# Patient Record
Sex: FEMALE | Employment: UNEMPLOYED | URBAN - METROPOLITAN AREA
[De-identification: names, ages, dates, MRNs, and addresses within clinical notes are randomized per-mention and may not be internally consistent; named-entity substitution may affect disease eponyms.]

---

## 2022-12-07 ENCOUNTER — ANCILLARY PROCEDURE (OUTPATIENT)
Dept: GENERAL RADIOLOGY | Facility: CLINIC | Age: 57
End: 2022-12-07
Attending: FAMILY MEDICINE
Payer: COMMERCIAL

## 2022-12-07 ENCOUNTER — OFFICE VISIT (OUTPATIENT)
Dept: ORTHOPEDICS | Facility: CLINIC | Age: 57
End: 2022-12-07
Payer: COMMERCIAL

## 2022-12-07 VITALS
HEIGHT: 67 IN | SYSTOLIC BLOOD PRESSURE: 166 MMHG | DIASTOLIC BLOOD PRESSURE: 108 MMHG | WEIGHT: 193.2 LBS | BODY MASS INDEX: 30.32 KG/M2

## 2022-12-07 DIAGNOSIS — M17.12 PRIMARY OSTEOARTHRITIS OF LEFT KNEE: Primary | ICD-10-CM

## 2022-12-07 DIAGNOSIS — M25.562 LEFT KNEE PAIN: ICD-10-CM

## 2022-12-07 DIAGNOSIS — M25.562 CHRONIC PAIN OF LEFT KNEE: ICD-10-CM

## 2022-12-07 DIAGNOSIS — G89.29 CHRONIC PAIN OF LEFT KNEE: ICD-10-CM

## 2022-12-07 PROCEDURE — 99204 OFFICE O/P NEW MOD 45 MIN: CPT | Performed by: FAMILY MEDICINE

## 2022-12-07 PROCEDURE — 73562 X-RAY EXAM OF KNEE 3: CPT | Mod: TC | Performed by: RADIOLOGY

## 2022-12-07 RX ORDER — MELOXICAM 15 MG/1
15 TABLET ORAL DAILY
Qty: 30 TABLET | Refills: 0 | Status: SHIPPED | OUTPATIENT
Start: 2022-12-07 | End: 2023-01-06

## 2022-12-07 NOTE — PATIENT INSTRUCTIONS
Thank you for choosing [facility]    [facility] LOCATIONS  [place] [address] [facility], [address]th [place]   [place], [address] [place] [address]   [phone]       APPOINTMENTS: [phone]    CARE QUESTIONS: [phone],    BILLING QUESTIONS: [phone]    FAX NUMBER: [phone]        Follow up: 6 weeks. Please call [phone] to schedule your follow up visit with Dr. Smalls.      1. Primary osteoarthritis of left knee    2. Left knee pain        Physical Therapy orders have been placed with [facility] (formally [facility])  You can call [phone] to schedule at your convenience.

## 2022-12-07 NOTE — LETTER
"    12/7/2022         RE: Jessy Jaquez  [address] [address]        Dear Colleague,    Thank you for referring your patient, Jessy Jaquez, to the [facility] [place]. Please see a copy of my visit note below.    CHIEF COMPLAINT:  Pain of the Left Knee       HISTORY OF PRESENT ILLNESS  Ms. Jaquez is a pleasant 57 year old year old female who presents to clinic today with left knee pain.  Jessy explains that her pain began 1 year ago. She developed swelling, pain and redness after a walk but does not recall an injury.  She has been having difficulty now with her beach body workouts, prolonged walking and has had quite a bit of difficulty over the summer with walking during vacations.    Onset: sudden  Location: left knee  Quality:  aching and stabbing  Duration: 1 years   Severity: 9/10 at worst  Timing:intermittent episodes   Modifying factors:  resting and non-use makes it better, movement and use makes it worse  Associated signs & symptoms: pain that worsens at night, swelling, decreased ROM, instability, \"slipping\" sensation going down stairs  Treatments to date: ice, heat, ibuprofen, activity modification    Additional history: as documented    Review of Systems:  Have you recently had a a fever, chills, weight loss? No  Do you have any vision problems? No  Do you have any chest pain or edema? No  Do you have any shortness of breath or wheezing?  No  Do you have stomach problems? No  Do you have any numbness or focal weakness? No  Do you have diabetes? No  Do you have problems with bleeding or clotting? No  Do you have an rashes or other skin lesions? No      MEDICAL HISTORY  There is no problem list on file for this patient.      Current Outpatient Medications   Medication Sig Dispense Refill     meloxicam (MOBIC) 15 MG tablet Take 1 tablet (15 mg) by mouth daily 30 tablet 0       No Known Allergies    No family history on file.    Additional " "medical/Social/Surgical histories reviewed in [facility] and updated as appropriate.       PHYSICAL EXAM  BP (!) 166/108   Ht 1.695 m (5' 6.75\")   Wt 87.6 kg (193 lb 3.2 oz)   BMI 30.49 kg/m      General  - normal appearance, in no obvious distress  Musculoskeletal - Left knee  - stance: mildly antalgic gait  - inspection: trace effusion  - palpation: medial joint line tenderness, tender medial patellar facet  - ROM: 120 degrees flexion, 0 degrees extension, painful active ROM  - strength: 5/5 in flexion, 5/5 in extension  - special tests:  (-) Derick  (-) varus at 0 and 30 degrees flexion  (-) valgus at 0 and 30 degrees flexion  Neuro  - no sensory or motor deficit, grossly normal coordination, normal muscle tone     IMAGING : XR left knee 3V. Final results and radiologist's interpretation, available in the [facility] health record. Images were reviewed with the patient/family members in the office today. My personal interpretation of the performed imaging is at least moderate medial compartment degenerative changes, mild patellofemoral degenerative change.     ASSESSMENT & PLAN  Ms. Jaquez is a 57 year old year old female who presents to clinic today with chronic left sided knee pain over the last 11 months.    Clinical examination as well as radiograph consistent with symptomatic medial compartment osteoarthritis of left knee    Diagnosis: Primary arthritis of left knee    Pathophysiology and treatment options discussed for osteoarthritis of knee.  We reviewed active weight management and loss of 1 lb is perceived as 4# loss perceived by knee.  Low impact exercise such as biking, swimming and elliptical is most beneficial.  Bracing options, injections and brief discussion about arthroplasty was also had.      We agreed to proceed with physical therapy initially, as her knee is not particularly painful over the last week.  I will see her back in 6 weeks and if she has been having intermittent flares with her rehab, I would " then recommend we start with corticosteroid injection at that time.  At this visit we will also consider treating prior authorization for future Synvisc or Durolane injections.    She can use prescribed meloxicam once daily as needed for particularly painful or breakthrough days.  She knows not to take Aleve or ibuprofen concurrently with meloxicam.    It was a pleasure seeing Jessy today.    Aniket Smalls DO, [facility]  Primary Care Sports Medicine      Again, thank you for allowing me to participate in the care of your patient.        Sincerely,        Aniket Smalls DO

## 2022-12-19 ENCOUNTER — THERAPY VISIT (OUTPATIENT)
Dept: PHYSICAL THERAPY | Facility: CLINIC | Age: 57
End: 2022-12-19
Attending: FAMILY MEDICINE
Payer: COMMERCIAL

## 2022-12-19 DIAGNOSIS — G89.29 CHRONIC PAIN OF LEFT KNEE: ICD-10-CM

## 2022-12-19 DIAGNOSIS — M17.12 PRIMARY OSTEOARTHRITIS OF LEFT KNEE: ICD-10-CM

## 2022-12-19 DIAGNOSIS — M25.562 CHRONIC PAIN OF LEFT KNEE: ICD-10-CM

## 2022-12-19 PROCEDURE — 97110 THERAPEUTIC EXERCISES: CPT | Mod: GP | Performed by: PHYSICAL THERAPIST

## 2022-12-19 PROCEDURE — 97530 THERAPEUTIC ACTIVITIES: CPT | Mod: GP | Performed by: PHYSICAL THERAPIST

## 2022-12-19 PROCEDURE — 97161 PT EVAL LOW COMPLEX 20 MIN: CPT | Mod: GP | Performed by: PHYSICAL THERAPIST

## 2022-12-19 ASSESSMENT — ACTIVITIES OF DAILY LIVING (ADL)
RAW_SCORE: 44
KNEE_ACTIVITY_OF_DAILY_LIVING_SUM: 44
GIVING WAY, BUCKLING OR SHIFTING OF KNEE: THE SYMPTOM AFFECTS MY ACTIVITY SLIGHTLY
GO DOWN STAIRS: ACTIVITY IS SOMEWHAT DIFFICULT
RISE FROM A CHAIR: ACTIVITY IS MINIMALLY DIFFICULT
WALK: ACTIVITY IS SOMEWHAT DIFFICULT
AS_A_RESULT_OF_YOUR_KNEE_INJURY,_HOW_WOULD_YOU_RATE_YOUR_CURRENT_LEVEL_OF_DAILY_ACTIVITY?: SEVERELY ABNORMAL
SWELLING: I DO NOT HAVE THE SYMPTOM
KNEE_ACTIVITY_OF_DAILY_LIVING_SCORE: 62.86
SIT WITH YOUR KNEE BENT: ACTIVITY IS SOMEWHAT DIFFICULT
KNEEL ON THE FRONT OF YOUR KNEE: ACTIVITY IS VERY DIFFICULT
PAIN: THE SYMPTOM AFFECTS MY ACTIVITY MODERATELY
GO UP STAIRS: ACTIVITY IS SOMEWHAT DIFFICULT
SQUAT: ACTIVITY IS SOMEWHAT DIFFICULT
LIMPING: THE SYMPTOM AFFECTS MY ACTIVITY SLIGHTLY
WEAKNESS: THE SYMPTOM AFFECTS MY ACTIVITY SLIGHTLY
STAND: ACTIVITY IS NOT DIFFICULT
HOW_WOULD_YOU_RATE_THE_CURRENT_FUNCTION_OF_YOUR_KNEE_DURING_YOUR_USUAL_DAILY_ACTIVITIES_ON_A_SCALE_FROM_0_TO_100_WITH_100_BEING_YOUR_LEVEL_OF_KNEE_FUNCTION_PRIOR_TO_YOUR_INJURY_AND_0_BEING_THE_INABILITY_TO_PERFORM_ANY_OF_YOUR_USUAL_DAILY_ACTIVITIES?: 50
STIFFNESS: THE SYMPTOM AFFECTS MY ACTIVITY SLIGHTLY
HOW_WOULD_YOU_RATE_THE_OVERALL_FUNCTION_OF_YOUR_KNEE_DURING_YOUR_USUAL_DAILY_ACTIVITIES?: ABNORMAL

## 2022-12-19 NOTE — PROGRESS NOTES
Physical Therapy Initial Evaluation  Subjective:  Pt reports that her knee pain has started to get better in the past month.  She loves to work out.  She lost 30 lbs in 2019.  Last October she went for a walk, and all of a sudden her knee felt like it was on fire and it started to swell.  It was painful for 5-6 months and she couldn't work out or do much.  She started doing yoga recently and has started feeling better - she has been forcing herself to work through some of the pain and now it is feeling better.  She takes meloxicam for pain, which helps.  She has twinges of pain intermittently on the medial side of her knee.  Her goal is to be able to work out again, because she has gained weight and wants to get healthy again.    The history is provided by the patient. No  was used.   Patient Health History  Jessy Jaquez being seen for L knee pain.     Problem began: 12/18/2021.   Problem occurred: walking   Pain is reported as 2/10 on pain scale.  General health as reported by patient is good.  Pertinent medical history includes: overweight and osteoarthritis.   Red flags:  None as reported by patient.  Medical allergies: none.   Surgeries include:  Other. Other surgery history details: appendix.    Current medications:  Pain medication.    Current occupation is writer.   Primary job tasks include:  Prolonged sitting.                  Therapist Generated HPI Evaluation         Type of problem:  Left knee.    This is a chronic condition.  Condition occurred with:  Degenerative joint disease.  Where condition occurred: for unknown reasons.  Patient reports pain:  Medial.  Pain is described as aching and sharp and is intermittent.  Pain is worse during the night.  Since onset symptoms are gradually improving.  Associated symptoms:  Loss of motion/stiffness and loss of strength. Symptoms are exacerbated by ascending stairs, descending stairs, bending/squatting, walking and sitting    Special tests  included:  X-ray.    Restrictions due to condition include:  Working in normal job without restrictions.  Barriers include:  None as reported by patient.                        Objective:  System                                           Hip Evaluation    Hip Strength:        Abduction:  Left: 4-/5     Pain:Right: 5/5    Pain:    Internal Rotation:  Left: 4+/5    Pain:Right: 5/5   Pain:  External Rotation:  Left: 4/5   +  Pain:  Right: 5/5   Pain:                     Knee Evaluation:  ROM:    AROM    Hyperextension:  Left:  0    Right: 4  Extension:  Left: 9 (PDM)    Right:  0  Flexion: Left: 120 (ERP)    Right: 127        Strength:     Extension:  Left: 4+/5   Pain:      Right: 5/5   Pain:  Flexion:  Left: 5/5   Pain:      Right: 5/5   Pain:    Quad Set Left: Good    Pain:   Quad Set Right: Good    Pain:    Special Tests: Special test for knee: L knee AROM 0-0-124 and able to do a partial squat wtih 0/10 pain after repeated seated knee ext w/self OP - indicates derangement at knee joint.          Mobility Testing:      Patellofemoral Medial:  Left: hypomobile    Right: normal  Patellofemoral Lateral:  Left: hypomobile    Right: normal  Patellofemoral Superior:  Left: hypomobile    Right: normal  Patellofemoral Inferior:  Left: hypomobile    Right: normal        General     ROS    Assessment/Plan:    Patient is a 57 year old female with left side knee complaints.    Patient has the following significant findings with corresponding treatment plan.                Diagnosis 1:  L knee pain secondary to derangement vs dysfunction  Pain -  hot/cold therapy, directional preference exercise and home program  Decreased ROM/flexibility - manual therapy, therapeutic exercise and home program  Decreased strength - therapeutic exercise, therapeutic activities and home program  Impaired muscle performance - neuro re-education and home program  Decreased function - therapeutic activities and home program    Therapy Evaluation  Codes:   Cumulative Therapy Evaluation is: Low complexity.    Previous and current functional limitations:  (See Goal Flow Sheet for this information)    Short term and Long term goals: (See Goal Flow Sheet for this information)     Communication ability:  Patient appears to be able to clearly communicate and understand verbal and written communication and follow directions correctly.  Treatment Explanation - The following has been discussed with the patient:   RX ordered/plan of care  Anticipated outcomes  Possible risks and side effects  This patient would benefit from PT intervention to resume normal activities.   Rehab potential is good.    Frequency:  1 X week, once daily  Duration:  for 6 weeks  Discharge Plan:  Achieve all LTG.  Independent in home treatment program.  Reach maximal therapeutic benefit.    Please refer to the daily flowsheet for treatment today, total treatment time and time spent performing 1:1 timed codes.

## 2023-01-04 ENCOUNTER — THERAPY VISIT (OUTPATIENT)
Dept: PHYSICAL THERAPY | Facility: CLINIC | Age: 58
End: 2023-01-04
Payer: COMMERCIAL

## 2023-01-04 DIAGNOSIS — G89.29 CHRONIC PAIN OF LEFT KNEE: Primary | ICD-10-CM

## 2023-01-04 DIAGNOSIS — M25.562 CHRONIC PAIN OF LEFT KNEE: Primary | ICD-10-CM

## 2023-01-04 PROCEDURE — 97110 THERAPEUTIC EXERCISES: CPT | Mod: GP | Performed by: PHYSICAL THERAPIST

## 2023-01-05 DIAGNOSIS — M17.12 PRIMARY OSTEOARTHRITIS OF LEFT KNEE: ICD-10-CM

## 2023-01-06 RX ORDER — MELOXICAM 15 MG/1
TABLET ORAL
Qty: 30 TABLET | Refills: 0 | Status: SHIPPED | OUTPATIENT
Start: 2023-01-06 | End: 2023-02-16

## 2023-01-31 ENCOUNTER — THERAPY VISIT (OUTPATIENT)
Dept: PHYSICAL THERAPY | Facility: CLINIC | Age: 58
End: 2023-01-31
Payer: COMMERCIAL

## 2023-01-31 DIAGNOSIS — G89.29 CHRONIC PAIN OF LEFT KNEE: Primary | ICD-10-CM

## 2023-01-31 DIAGNOSIS — M25.562 CHRONIC PAIN OF LEFT KNEE: Primary | ICD-10-CM

## 2023-01-31 PROCEDURE — 97112 NEUROMUSCULAR REEDUCATION: CPT | Mod: GP | Performed by: PHYSICAL THERAPIST

## 2023-01-31 PROCEDURE — 97110 THERAPEUTIC EXERCISES: CPT | Mod: GP | Performed by: PHYSICAL THERAPIST

## 2023-01-31 ASSESSMENT — ACTIVITIES OF DAILY LIVING (ADL)
SQUAT: ACTIVITY IS NOT DIFFICULT
GO DOWN STAIRS: ACTIVITY IS NOT DIFFICULT
KNEE_ACTIVITY_OF_DAILY_LIVING_SCORE: 94.29
RAW_SCORE: 66
STAND: ACTIVITY IS NOT DIFFICULT
KNEE_ACTIVITY_OF_DAILY_LIVING_SUM: 66
SIT WITH YOUR KNEE BENT: ACTIVITY IS NOT DIFFICULT
PAIN: I HAVE THE SYMPTOM BUT IT DOES NOT AFFECT MY ACTIVITY
AS_A_RESULT_OF_YOUR_KNEE_INJURY,_HOW_WOULD_YOU_RATE_YOUR_CURRENT_LEVEL_OF_DAILY_ACTIVITY?: NORMAL
LIMPING: I DO NOT HAVE THE SYMPTOM
HOW_WOULD_YOU_RATE_THE_OVERALL_FUNCTION_OF_YOUR_KNEE_DURING_YOUR_USUAL_DAILY_ACTIVITIES?: NEARLY NORMAL
WEAKNESS: I DO NOT HAVE THE SYMPTOM
KNEEL ON THE FRONT OF YOUR KNEE: ACTIVITY IS MINIMALLY DIFFICULT
HOW_WOULD_YOU_RATE_THE_CURRENT_FUNCTION_OF_YOUR_KNEE_DURING_YOUR_USUAL_DAILY_ACTIVITIES_ON_A_SCALE_FROM_0_TO_100_WITH_100_BEING_YOUR_LEVEL_OF_KNEE_FUNCTION_PRIOR_TO_YOUR_INJURY_AND_0_BEING_THE_INABILITY_TO_PERFORM_ANY_OF_YOUR_USUAL_DAILY_ACTIVITIES?: 80
SWELLING: I DO NOT HAVE THE SYMPTOM
STIFFNESS: THE SYMPTOM AFFECTS MY ACTIVITY SLIGHTLY
RISE FROM A CHAIR: ACTIVITY IS NOT DIFFICULT
GIVING WAY, BUCKLING OR SHIFTING OF KNEE: I DO NOT HAVE THE SYMPTOM
GO UP STAIRS: ACTIVITY IS NOT DIFFICULT
WALK: ACTIVITY IS NOT DIFFICULT

## 2023-02-01 NOTE — PROGRESS NOTES
"DISCHARGE REPORT    Progress reporting period is from 12/19/2022 to 1/31/2023.       SUBJECTIVE  Subjective changes noted by patient:  Pt reports that her knee is feeling better - it is definitely feeling strong.  She is able to take the stairs better.  She doesn't feel like she is limping, but she still gets some \"twinges\" of sharp pain on the inside of her L knee when stepping down with walking.  She has been doing a work-out where she does some shallow squats and some yoga poses that she hasn't been able to do for a while.  She feels ready today after the PT session to continue with her HEP independently to self-manage her symptoms.  Changes in function:  Yes (See Goal flowsheet attached for changes in current functional level)  Adverse reaction to treatment or activity: None    OBJECTIVE  Changes noted in objective findings:  Yes, see below.  Objective: L knee AROM 2-0-135.  MMT L knee ext 5/5, flex 5/5, hip abd 5/5, hip IR 5/5, hip ER 5/5.  Pain with standing on L LE only when allowing arch to fall at foot - spent today on CKC exercises with focus on maintaining arch in foot.  Discussed benefits of arch supports.     ASSESSMENT/PLAN  STG/LTGs have been met or progress has been made towards goals:  Yes (See Goal flow sheet completed today.)  Assessment of Progress: The patient has met all of their long term goals.  Self Management Plans:  Patient is independent in a home treatment program.  I have re-evaluated this patient and find that the nature, scope, duration and intensity of the therapy is appropriate for the medical condition of the patient.  Jessy continues to require the following intervention to meet STG and LTG's:  PT intervention is no longer required to meet STG/LTG.    Recommendations:  This patient is ready to be discharged from therapy and continue their home treatment program.    Please refer to the daily flowsheet for treatment today, total treatment time and time spent performing 1:1 timed " codes.

## 2023-02-14 DIAGNOSIS — M17.12 PRIMARY OSTEOARTHRITIS OF LEFT KNEE: ICD-10-CM

## 2023-02-16 RX ORDER — MELOXICAM 15 MG/1
TABLET ORAL
Qty: 30 TABLET | Refills: 0 | Status: SHIPPED | OUTPATIENT
Start: 2023-02-16

## 2025-03-13 NOTE — PROGRESS NOTES
"CHIEF COMPLAINT:  Pain of the Left Knee       HISTORY OF PRESENT ILLNESS  Ms. Jaquze is a pleasant 57 year old year old female who presents to clinic today with left knee pain.  Jessy explains that her pain began 1 year ago. She developed swelling, pain and redness after a walk but does not recall an injury.  She has been having difficulty now with her beach body workouts, prolonged walking and has had quite a bit of difficulty over the summer with walking during vacations.    Onset: sudden  Location: left knee  Quality:  aching and stabbing  Duration: 1 years   Severity: 9/10 at worst  Timing:intermittent episodes   Modifying factors:  resting and non-use makes it better, movement and use makes it worse  Associated signs & symptoms: pain that worsens at night, swelling, decreased ROM, instability, \"slipping\" sensation going down stairs  Treatments to date: ice, heat, ibuprofen, activity modification    Additional history: as documented    Review of Systems:  Have you recently had a a fever, chills, weight loss? No  Do you have any vision problems? No  Do you have any chest pain or edema? No  Do you have any shortness of breath or wheezing?  No  Do you have stomach problems? No  Do you have any numbness or focal weakness? No  Do you have diabetes? No  Do you have problems with bleeding or clotting? No  Do you have an rashes or other skin lesions? No      MEDICAL HISTORY  There is no problem list on file for this patient.      Current Outpatient Medications   Medication Sig Dispense Refill     meloxicam (MOBIC) 15 MG tablet Take 1 tablet (15 mg) by mouth daily 30 tablet 0       No Known Allergies    No family history on file.    Additional medical/Social/Surgical histories reviewed in [facility] and updated as appropriate.       PHYSICAL EXAM  BP (!) 166/108   Ht 1.695 m (5' 6.75\")   Wt 87.6 kg (193 lb 3.2 oz)   BMI 30.49 kg/m      General  - normal appearance, in no obvious distress  Musculoskeletal - Left knee  - " stance: mildly antalgic gait  - inspection: trace effusion  - palpation: medial joint line tenderness, tender medial patellar facet  - ROM: 120 degrees flexion, 0 degrees extension, painful active ROM  - strength: 5/5 in flexion, 5/5 in extension  - special tests:  (-) Derick  (-) varus at 0 and 30 degrees flexion  (-) valgus at 0 and 30 degrees flexion  Neuro  - no sensory or motor deficit, grossly normal coordination, normal muscle tone     IMAGING : XR left knee 3V. Final results and radiologist's interpretation, available in the [facility] health record. Images were reviewed with the patient/family members in the office today. My personal interpretation of the performed imaging is at least moderate medial compartment degenerative changes, mild patellofemoral degenerative change.     ASSESSMENT & PLAN  Ms. Jaquez is a 57 year old year old female who presents to clinic today with chronic left sided knee pain over the last 11 months.    Clinical examination as well as radiograph consistent with symptomatic medial compartment osteoarthritis of left knee    Diagnosis: Primary arthritis of left knee    Pathophysiology and treatment options discussed for osteoarthritis of knee.  We reviewed active weight management and loss of 1 lb is perceived as 4# loss perceived by knee.  Low impact exercise such as biking, swimming and elliptical is most beneficial.  Bracing options, injections and brief discussion about arthroplasty was also had.      We agreed to proceed with physical therapy initially, as her knee is not particularly painful over the last week.  I will see her back in 6 weeks and if she has been having intermittent flares with her rehab, I would then recommend we start with corticosteroid injection at that time.  At this visit we will also consider treating prior authorization for future Synvisc or Durolane injections.    She can use prescribed meloxicam once daily as needed for particularly painful or breakthrough  days.  She knows not to take Aleve or ibuprofen concurrently with meloxicam.    It was a pleasure seeing Jessy today.    Aniket Smalls DO, CAQSM  Primary Care Sports Medicine   denies